# Patient Record
Sex: MALE | Employment: UNEMPLOYED | URBAN - NONMETROPOLITAN AREA
[De-identification: names, ages, dates, MRNs, and addresses within clinical notes are randomized per-mention and may not be internally consistent; named-entity substitution may affect disease eponyms.]

---

## 2024-03-26 ENCOUNTER — OFFICE VISIT (OUTPATIENT)
Dept: SURGERY | Age: 34
End: 2024-03-26
Payer: COMMERCIAL

## 2024-03-26 VITALS
TEMPERATURE: 97.9 F | HEART RATE: 84 BPM | HEIGHT: 70 IN | OXYGEN SATURATION: 99 % | BODY MASS INDEX: 26.48 KG/M2 | WEIGHT: 185 LBS

## 2024-03-26 DIAGNOSIS — K43.6 INCARCERATED VENTRAL HERNIA: Primary | ICD-10-CM

## 2024-03-26 PROCEDURE — 99203 OFFICE O/P NEW LOW 30 MIN: CPT | Performed by: SURGERY

## 2024-03-26 ASSESSMENT — ENCOUNTER SYMPTOMS
DIARRHEA: 0
ABDOMINAL PAIN: 1
BACK PAIN: 0
EYE PAIN: 0
EYE REDNESS: 0
NAUSEA: 0
COLOR CHANGE: 0
SORE THROAT: 0
CONSTIPATION: 0
VOMITING: 0
ABDOMINAL DISTENTION: 0
COUGH: 0
SHORTNESS OF BREATH: 0
CHEST TIGHTNESS: 0

## 2024-03-26 NOTE — PROGRESS NOTES
than 15 pounds pulling for 6 weeks.     Given he is incarcerated, will need to work with the [facility] regarding scheduling, and open or robotic. Preference is robotic if it can be approved.     Return for Post-operative Visit.    Linn Jenkins DO 3/26/2024 10:04 AM

## 2024-03-27 ENCOUNTER — TELEPHONE (OUTPATIENT)
Dept: SURGERY | Age: 34
End: 2024-03-27

## 2024-03-27 NOTE — TELEPHONE ENCOUNTER
Spoke with Loulou Orellana/[facility] regarding preop testing - Forwarded her to preadmission testing - She will call our office when she gets the surgery approved

## 2024-03-27 NOTE — TELEPHONE ENCOUNTER
3/27/2024 Loulou Orellana with [facility] called and stated she saw where patient was recommended for surgical repair.  She is needing information regarding Pre-op testing and surgery.  Please return call.    Callback # [phone]  or ask for Loulou clifton

## 2024-04-29 ENCOUNTER — HOSPITAL ENCOUNTER (OUTPATIENT)
Dept: PREADMISSION TESTING | Age: 34
Discharge: HOME | End: 2024-05-03
Payer: COMMERCIAL

## 2024-04-29 VITALS — WEIGHT: 170 LBS | BODY MASS INDEX: 24.39 KG/M2

## 2024-04-29 RX ORDER — IBUPROFEN 600 MG/1
600 TABLET ORAL 2 TIMES DAILY PRN
COMMUNITY

## 2024-04-29 RX ORDER — DIPHENHYDRAMINE HCL 50 MG
50 CAPSULE ORAL EVERY 8 HOURS PRN
COMMUNITY

## 2024-04-29 RX ORDER — HYDROCORTISONE 25 MG/ML
LOTION TOPICAL DAILY
COMMUNITY

## 2024-05-02 ENCOUNTER — ANESTHESIA (OUTPATIENT)
Dept: OPERATING ROOM | Age: 34
End: 2024-05-02
Payer: COMMERCIAL

## 2024-05-02 ENCOUNTER — HOSPITAL ENCOUNTER (OUTPATIENT)
Age: 34
Setting detail: OUTPATIENT SURGERY
Discharge: HOME | End: 2024-05-02
Attending: SURGERY | Admitting: SURGERY
Payer: COMMERCIAL

## 2024-05-02 ENCOUNTER — ANESTHESIA EVENT (OUTPATIENT)
Dept: OPERATING ROOM | Age: 34
End: 2024-05-02
Payer: COMMERCIAL

## 2024-05-02 VITALS
OXYGEN SATURATION: 95 % | DIASTOLIC BLOOD PRESSURE: 91 MMHG | SYSTOLIC BLOOD PRESSURE: 141 MMHG | HEIGHT: 70 IN | TEMPERATURE: 97.2 F | WEIGHT: 170 LBS | BODY MASS INDEX: 24.34 KG/M2 | RESPIRATION RATE: 16 BRPM | HEART RATE: 87 BPM

## 2024-05-02 DIAGNOSIS — K43.6 INCARCERATED VENTRAL HERNIA: Primary | ICD-10-CM

## 2024-05-02 PROCEDURE — 6360000002 HC RX W HCPCS: Performed by: SURGERY

## 2024-05-02 PROCEDURE — 2500000003 HC RX 250 WO HCPCS: Performed by: NURSE ANESTHETIST, CERTIFIED REGISTERED

## 2024-05-02 PROCEDURE — 3700000000 HC ANESTHESIA ATTENDED CARE: Performed by: SURGERY

## 2024-05-02 PROCEDURE — 2580000003 HC RX 258: Performed by: SURGERY

## 2024-05-02 PROCEDURE — 3600000019 HC SURGERY ROBOT ADDTL 15MIN: Performed by: SURGERY

## 2024-05-02 PROCEDURE — 7100000010 HC PHASE II RECOVERY - FIRST 15 MIN: Performed by: SURGERY

## 2024-05-02 PROCEDURE — 49593 RPR AA HRN 1ST 3-10 RDC: CPT | Performed by: SURGERY

## 2024-05-02 PROCEDURE — 3600000009 HC SURGERY ROBOT BASE: Performed by: SURGERY

## 2024-05-02 PROCEDURE — 6360000002 HC RX W HCPCS: Performed by: NURSE ANESTHETIST, CERTIFIED REGISTERED

## 2024-05-02 PROCEDURE — C9290 INJ, BUPIVACAINE LIPOSOME: HCPCS | Performed by: SURGERY

## 2024-05-02 PROCEDURE — 7100000001 HC PACU RECOVERY - ADDTL 15 MIN: Performed by: SURGERY

## 2024-05-02 PROCEDURE — 7100000011 HC PHASE II RECOVERY - ADDTL 15 MIN: Performed by: SURGERY

## 2024-05-02 PROCEDURE — 2580000003 HC RX 258: Performed by: ANESTHESIOLOGY

## 2024-05-02 PROCEDURE — C1781 MESH (IMPLANTABLE): HCPCS | Performed by: SURGERY

## 2024-05-02 PROCEDURE — 2709999900 HC NON-CHARGEABLE SUPPLY: Performed by: SURGERY

## 2024-05-02 PROCEDURE — S2900 ROBOTIC SURGICAL SYSTEM: HCPCS | Performed by: SURGERY

## 2024-05-02 PROCEDURE — 6360000002 HC RX W HCPCS: Performed by: ANESTHESIOLOGY

## 2024-05-02 PROCEDURE — 3700000001 HC ADD 15 MINUTES (ANESTHESIA): Performed by: SURGERY

## 2024-05-02 PROCEDURE — 7100000000 HC PACU RECOVERY - FIRST 15 MIN: Performed by: SURGERY

## 2024-05-02 DEVICE — MESH SURG DIA4.5IN CIR W/ ECHO 2 POS SYS VENTRALIGHT: Type: IMPLANTABLE DEVICE | Status: FUNCTIONAL

## 2024-05-02 RX ORDER — PROPOFOL 10 MG/ML
INJECTION, EMULSION INTRAVENOUS PRN
Status: DISCONTINUED | OUTPATIENT
Start: 2024-05-02 | End: 2024-05-02 | Stop reason: SDUPTHER

## 2024-05-02 RX ORDER — SODIUM CHLORIDE, SODIUM LACTATE, POTASSIUM CHLORIDE, CALCIUM CHLORIDE 600; 310; 30; 20 MG/100ML; MG/100ML; MG/100ML; MG/100ML
INJECTION, SOLUTION INTRAVENOUS CONTINUOUS
Status: DISCONTINUED | OUTPATIENT
Start: 2024-05-02 | End: 2024-05-02 | Stop reason: HOSPADM

## 2024-05-02 RX ORDER — SODIUM CHLORIDE 0.9 % (FLUSH) 0.9 %
5-40 SYRINGE (ML) INJECTION PRN
Status: DISCONTINUED | OUTPATIENT
Start: 2024-05-02 | End: 2024-05-02 | Stop reason: HOSPADM

## 2024-05-02 RX ORDER — HYDRALAZINE HYDROCHLORIDE 20 MG/ML
10 INJECTION INTRAMUSCULAR; INTRAVENOUS
Status: DISCONTINUED | OUTPATIENT
Start: 2024-05-02 | End: 2024-05-02 | Stop reason: HOSPADM

## 2024-05-02 RX ORDER — HYDROMORPHONE HYDROCHLORIDE 1 MG/ML
0.25 INJECTION, SOLUTION INTRAMUSCULAR; INTRAVENOUS; SUBCUTANEOUS EVERY 5 MIN PRN
Status: DISCONTINUED | OUTPATIENT
Start: 2024-05-02 | End: 2024-05-02 | Stop reason: HOSPADM

## 2024-05-02 RX ORDER — LIDOCAINE HYDROCHLORIDE 10 MG/ML
INJECTION, SOLUTION INFILTRATION; PERINEURAL PRN
Status: DISCONTINUED | OUTPATIENT
Start: 2024-05-02 | End: 2024-05-02 | Stop reason: SDUPTHER

## 2024-05-02 RX ORDER — LABETALOL 20 MG/4 ML (5 MG/ML) INTRAVENOUS SYRINGE
PRN
Status: DISCONTINUED | OUTPATIENT
Start: 2024-05-02 | End: 2024-05-02 | Stop reason: SDUPTHER

## 2024-05-02 RX ORDER — ONDANSETRON 2 MG/ML
INJECTION INTRAMUSCULAR; INTRAVENOUS PRN
Status: DISCONTINUED | OUTPATIENT
Start: 2024-05-02 | End: 2024-05-02 | Stop reason: SDUPTHER

## 2024-05-02 RX ORDER — NALOXONE HYDROCHLORIDE 0.4 MG/ML
INJECTION, SOLUTION INTRAMUSCULAR; INTRAVENOUS; SUBCUTANEOUS PRN
Status: DISCONTINUED | OUTPATIENT
Start: 2024-05-02 | End: 2024-05-02 | Stop reason: HOSPADM

## 2024-05-02 RX ORDER — HYDROMORPHONE HYDROCHLORIDE 1 MG/ML
0.5 INJECTION, SOLUTION INTRAMUSCULAR; INTRAVENOUS; SUBCUTANEOUS EVERY 5 MIN PRN
Status: DISCONTINUED | OUTPATIENT
Start: 2024-05-02 | End: 2024-05-02 | Stop reason: HOSPADM

## 2024-05-02 RX ORDER — ROCURONIUM BROMIDE 10 MG/ML
INJECTION, SOLUTION INTRAVENOUS PRN
Status: DISCONTINUED | OUTPATIENT
Start: 2024-05-02 | End: 2024-05-02 | Stop reason: SDUPTHER

## 2024-05-02 RX ORDER — ESMOLOL HYDROCHLORIDE 10 MG/ML
INJECTION INTRAVENOUS PRN
Status: DISCONTINUED | OUTPATIENT
Start: 2024-05-02 | End: 2024-05-02 | Stop reason: SDUPTHER

## 2024-05-02 RX ORDER — FENTANYL CITRATE 50 UG/ML
INJECTION, SOLUTION INTRAMUSCULAR; INTRAVENOUS PRN
Status: DISCONTINUED | OUTPATIENT
Start: 2024-05-02 | End: 2024-05-02 | Stop reason: SDUPTHER

## 2024-05-02 RX ORDER — CEFAZOLIN SODIUM 1 G/3ML
INJECTION, POWDER, FOR SOLUTION INTRAMUSCULAR; INTRAVENOUS PRN
Status: DISCONTINUED | OUTPATIENT
Start: 2024-05-02 | End: 2024-05-02 | Stop reason: SDUPTHER

## 2024-05-02 RX ORDER — IPRATROPIUM BROMIDE AND ALBUTEROL SULFATE 2.5; .5 MG/3ML; MG/3ML
1 SOLUTION RESPIRATORY (INHALATION)
Status: DISCONTINUED | OUTPATIENT
Start: 2024-05-02 | End: 2024-05-02 | Stop reason: HOSPADM

## 2024-05-02 RX ORDER — PROCHLORPERAZINE EDISYLATE 5 MG/ML
5 INJECTION INTRAMUSCULAR; INTRAVENOUS
Status: DISCONTINUED | OUTPATIENT
Start: 2024-05-02 | End: 2024-05-02 | Stop reason: HOSPADM

## 2024-05-02 RX ORDER — DIPHENHYDRAMINE HYDROCHLORIDE 50 MG/ML
12.5 INJECTION INTRAMUSCULAR; INTRAVENOUS
Status: DISCONTINUED | OUTPATIENT
Start: 2024-05-02 | End: 2024-05-02 | Stop reason: HOSPADM

## 2024-05-02 RX ORDER — TRAMADOL HYDROCHLORIDE 50 MG/1
50 TABLET ORAL EVERY 6 HOURS PRN
Qty: 12 TABLET | Refills: 0 | Status: SHIPPED | OUTPATIENT
Start: 2024-05-02 | End: 2024-05-05

## 2024-05-02 RX ORDER — LABETALOL HYDROCHLORIDE 5 MG/ML
10 INJECTION, SOLUTION INTRAVENOUS
Status: DISCONTINUED | OUTPATIENT
Start: 2024-05-02 | End: 2024-05-02 | Stop reason: HOSPADM

## 2024-05-02 RX ORDER — SODIUM CHLORIDE 0.9 % (FLUSH) 0.9 %
5-40 SYRINGE (ML) INJECTION EVERY 12 HOURS SCHEDULED
Status: DISCONTINUED | OUTPATIENT
Start: 2024-05-02 | End: 2024-05-02 | Stop reason: HOSPADM

## 2024-05-02 RX ORDER — DEXAMETHASONE SODIUM PHOSPHATE 10 MG/ML
INJECTION, SOLUTION INTRAMUSCULAR; INTRAVENOUS PRN
Status: DISCONTINUED | OUTPATIENT
Start: 2024-05-02 | End: 2024-05-02 | Stop reason: SDUPTHER

## 2024-05-02 RX ORDER — MEPERIDINE HYDROCHLORIDE 25 MG/ML
12.5 INJECTION INTRAMUSCULAR; INTRAVENOUS; SUBCUTANEOUS
Status: DISCONTINUED | OUTPATIENT
Start: 2024-05-02 | End: 2024-05-02 | Stop reason: HOSPADM

## 2024-05-02 RX ORDER — SODIUM CHLORIDE 9 MG/ML
INJECTION, SOLUTION INTRAVENOUS PRN
Status: DISCONTINUED | OUTPATIENT
Start: 2024-05-02 | End: 2024-05-02 | Stop reason: HOSPADM

## 2024-05-02 RX ADMIN — CEFAZOLIN 2 G: 1 INJECTION, POWDER, FOR SOLUTION INTRAMUSCULAR; INTRAVENOUS at 10:51

## 2024-05-02 RX ADMIN — ONDANSETRON 4 MG: 2 INJECTION INTRAMUSCULAR; INTRAVENOUS at 11:40

## 2024-05-02 RX ADMIN — DEXAMETHASONE SODIUM PHOSPHATE 5 MG: 10 INJECTION, SOLUTION INTRAMUSCULAR; INTRAVENOUS at 10:55

## 2024-05-02 RX ADMIN — FENTANYL CITRATE 50 MCG: 0.05 INJECTION, SOLUTION INTRAMUSCULAR; INTRAVENOUS at 10:46

## 2024-05-02 RX ADMIN — LIDOCAINE HYDROCHLORIDE 50 MG: 10 INJECTION, SOLUTION INFILTRATION; PERINEURAL at 10:40

## 2024-05-02 RX ADMIN — HYDROMORPHONE HYDROCHLORIDE 0.5 MG: 1 INJECTION, SOLUTION INTRAMUSCULAR; INTRAVENOUS; SUBCUTANEOUS at 12:59

## 2024-05-02 RX ADMIN — SUGAMMADEX 300 MG: 100 INJECTION, SOLUTION INTRAVENOUS at 11:40

## 2024-05-02 RX ADMIN — FENTANYL CITRATE 50 MCG: 0.05 INJECTION, SOLUTION INTRAMUSCULAR; INTRAVENOUS at 11:01

## 2024-05-02 RX ADMIN — FENTANYL CITRATE 50 MCG: 0.05 INJECTION, SOLUTION INTRAMUSCULAR; INTRAVENOUS at 11:06

## 2024-05-02 RX ADMIN — SODIUM CHLORIDE, SODIUM LACTATE, POTASSIUM CHLORIDE, AND CALCIUM CHLORIDE: 600; 310; 30; 20 INJECTION, SOLUTION INTRAVENOUS at 11:46

## 2024-05-02 RX ADMIN — ESMOLOL HYDROCHLORIDE 40 MG: 10 INJECTION, SOLUTION INTRAVENOUS at 10:49

## 2024-05-02 RX ADMIN — ROCURONIUM BROMIDE 80 MG: 10 INJECTION, SOLUTION INTRAVENOUS at 10:40

## 2024-05-02 RX ADMIN — LABETALOL 20 MG/4 ML (5 MG/ML) INTRAVENOUS SYRINGE 5 MG: at 11:08

## 2024-05-02 RX ADMIN — SODIUM CHLORIDE, SODIUM LACTATE, POTASSIUM CHLORIDE, AND CALCIUM CHLORIDE: 600; 310; 30; 20 INJECTION, SOLUTION INTRAVENOUS at 08:50

## 2024-05-02 RX ADMIN — FENTANYL CITRATE 100 MCG: 0.05 INJECTION, SOLUTION INTRAMUSCULAR; INTRAVENOUS at 10:40

## 2024-05-02 RX ADMIN — PROPOFOL 200 MG: 10 INJECTION, EMULSION INTRAVENOUS at 10:40

## 2024-05-02 ASSESSMENT — PAIN DESCRIPTION - DESCRIPTORS: DESCRIPTORS: ACHING

## 2024-05-02 ASSESSMENT — PAIN - FUNCTIONAL ASSESSMENT
PAIN_FUNCTIONAL_ASSESSMENT: 0-10
PAIN_FUNCTIONAL_ASSESSMENT: 0-10
PAIN_FUNCTIONAL_ASSESSMENT: NONE - DENIES PAIN

## 2024-05-02 ASSESSMENT — LIFESTYLE VARIABLES: SMOKING_STATUS: 1

## 2024-05-02 NOTE — OP NOTE
Operative Note      Patient: Sofia Berg  YOB: 1990  MRN: [id number]    Date of Procedure: 5/2/2024    Pre-Op Diagnosis Codes:     * Incarcerated ventral hernia [K43.6]    Post-Op Diagnosis: Same       Procedure(s):  ROBOTIC INCARCERATED VENTRAL HERNIA REPAIR WITH MESH    Surgeon(s):  Linn Jenkins DO    Assistant:   First Assistant: Cortney Pressley RN    Anesthesia: General    Estimated Blood Loss (mL): Minimal    Complications: None    Specimens:   * No specimens in log *    Implants:  Implant Name Type Inv. Item Serial No.  Lot No. LRB No. Used Action   MESH SURG DIA4.5IN CIR W/ ECHO 2 POS SYS VENTRALIGHT - [id number]  MESH SURG DIA4.5IN CIR W/ ECHO 2 POS SYS VENTRALIGHT  BARD DAVOL-WD [id number] N/A 1 Implanted         Drains:   NG/OG/NJ/NE Tube Orogastric 18 fr Center mouth (Active)       Findings:  Infection Present At Time Of Surgery (PATOS) (choose all levels that have infection present):  No infection present  Other Findings: 3 cm defect. Closed robotically    Detailed Description of Procedure:   Mr. Berg was taken to the main operating room and placed on the operating  table supine with the left side raised slightly on a bump and slightly jackknifed. After the induction of adequate general endotracheal anesthesia, the abdomen was prepped and draped to a sterile field with I-O band. A time-out was undertaken.     A small incision was made two fingerbreadths below the costal margin on the left side after instilling local anesthesic, and through this an optiview trocar was inserted, the abdomen was entered, and a pneumoperitoneum created.  A laparoscope was advanced and inspection undertaken. No evidence of trauma from the trocar site insertion was appreciated. The hernia was seen along the midline with overlying fat pads adherent in the area.      Two additional 8 mm ports were placed along the left anterior axillary line and just superior to the ASIS.  The camera was moved to

## 2024-05-02 NOTE — ANESTHESIA PRE PROCEDURE
Department of Anesthesiology  Preprocedure Note       Name:  Sofia Berg   Age:  33 y.o.  :  1990                                          MRN:  [id number]         Date:  2024      Surgeon: Surgeon(s):  Linn Jenkins DO    Procedure: Procedure(s):  ROBOTIC INCARCERATED VENTRAL HERNIA REPAIR WITH MESH    Medications prior to admission:   Prior to Admission medications    Medication Sig Start Date End Date Taking? Authorizing Provider   diphenhydrAMINE (BENADRYL) 50 MG capsule Take 1 capsule by mouth every 8 hours as needed for Itching    ProviderTiti MD   hydrocortisone (HYTONE) 2.5 % lotion Apply topically daily    ProviderTiti MD   ibuprofen (ADVIL;MOTRIN) 600 MG tablet Take 1 tablet by mouth 2 times daily as needed    ProviderTiti MD       Current medications:    Current Facility-Administered Medications   Medication Dose Route Frequency Provider Last Rate Last Admin   • lactated ringers IV soln infusion   IntraVENous Continuous Bibiana Gonzalez  mL/hr at 24 0850 New Bag at 24 0850       Allergies:  No Known Allergies    Problem List:    Patient Active Problem List   Diagnosis Code   • Incarcerated ventral hernia K43.6       Past Medical History:        Diagnosis Date   • Ventral hernia        Past Surgical History:  History reviewed. No pertinent surgical history.    Social History:    Social History     Tobacco Use   • Smoking status: Former     Current packs/day: 0.25     Types: Cigarettes   • Smokeless tobacco: Never   • Tobacco comments:     Current inmate at [facility] as of 2023   Substance Use Topics   • Alcohol use: Not Currently     Comment: Inmate                                Counseling given: Not Answered  Tobacco comments: Current inmate at [facility] as of 2023      Vital Signs (Current):   Vitals:    24 0839   BP: (!) 143/90   Pulse: 85   Resp: 18   Temp: 97.4 °F (36.3 °C)

## 2024-05-02 NOTE — ANESTHESIA POSTPROCEDURE EVALUATION
Department of Anesthesiology  Postprocedure Note    Patient: Sofia Berg  MRN: [id number]  YOB: 1990  Date of evaluation: 5/2/2024    Procedure Summary       Date: 05/02/24 Room / Location: [address]    Anesthesia Start: 1037 Anesthesia Stop: 1202    Procedure: ROBOTIC INCARCERATED VENTRAL HERNIA REPAIR WITH MESH Diagnosis:       Incarcerated ventral hernia      (Incarcerated ventral hernia [K43.6])    Surgeons: Linn Jenkins DO Responsible Provider: John White, APRN - CRNA    Anesthesia Type: general ASA Status: 2            Anesthesia Type: No value filed.    Erica Phase I:      Erica Phase II:      Anesthesia Post Evaluation    Patient location during evaluation: PACU  Patient participation: waiting for patient participation  Level of consciousness: obtunded/minimal responses  Pain score: 0  Airway patency: patent  Nausea & Vomiting: no vomiting and no nausea  Cardiovascular status: hemodynamically stable  Respiratory status: acceptable  Hydration status: stable  Comments: T 97.2  Multimodal analgesia pain management approach  Pain management: adequate    No notable events documented.

## 2024-05-02 NOTE — DISCHARGE INSTR - ACTIVITY
No heavy lifting. No lifting more than 15 pounds for 6 weeks.   No work for 2 weeks.  Wear Abdominal Binder at all times when up and about.   May shower tomorrow.   Do not soak in tub.  No swimming pools, oceans, lakes, etc for 2 weeks.   Do not drive while on pain medications.      Avoid constipation.   Take colace nightly (up to 300 mg) and miralax daily (up to three doses).   Drink 64 ounces of water and take a powdered fiber supplement daily (ie-Metamucil).    If you have signs of infection, call you doctor. These include:   -Increased pain, swelling, warmth, or redness  -Red streaks leading from the incision  -Pus draining from the incision  -A fever > 100.4         Smoking increases your risk for wound infection and additional complications.

## 2024-05-02 NOTE — ANESTHESIA POSTPROCEDURE EVALUATION
Department of Anesthesiology  Postprocedure Note    Patient: Sofia Berg  MRN: [id number]  YOB: 1990  Date of evaluation: 5/2/2024    Procedure Summary       Date: 05/02/24 Room / Location: [address]    Anesthesia Start: 1037 Anesthesia Stop: 1202    Procedure: ROBOTIC INCARCERATED VENTRAL HERNIA REPAIR WITH MESH Diagnosis:       Incarcerated ventral hernia      (Incarcerated ventral hernia [K43.6])    Surgeons: Linn Jenkins DO Responsible Provider: John White, APRN - CRNA    Anesthesia Type: general ASA Status: 2            Anesthesia Type: No value filed.    Erica Phase I:      Erica Phase II:      Anesthesia Post Evaluation    Patient location during evaluation: PACU  Patient participation: waiting for patient participation  Level of consciousness: obtunded/minimal responses  Pain score: 0  Airway patency: patent  Nausea & Vomiting: no vomiting and no nausea  Cardiovascular status: hemodynamically stable  Respiratory status: acceptable  Hydration status: stable  Comments: T 97.2  Multimodal analgesia pain management approach    No notable events documented.

## 2024-05-02 NOTE — PROGRESS NOTES
Patient presents to pacu with one guard at bedside.  Electronically signed by Juan Pablo Monroe RN on 5/2/2024 at 12:11 PM

## 2024-05-02 NOTE — H&P
bruise/bleed easily.   Psychiatric/Behavioral:  Negative for confusion, sleep disturbance and suicidal ideas.          OBJECTIVE:  Pulse 84   Temp 97.9 °F (36.6 °C) (Temporal)   Ht 1.778 m (5' 10\")   Wt 83.9 kg (185 lb)   SpO2 99%   BMI 26.54 kg/m²   Physical Exam  Vitals reviewed.   Constitutional:       Appearance: He is well-developed.   HENT:      Head: Normocephalic and atraumatic.   Eyes:      Pupils: Pupils are equal, round, and reactive to light.   Cardiovascular:      Rate and Rhythm: Normal rate.   Pulmonary:      Effort: Pulmonary effort is normal.   Abdominal:      General: There is no distension.      Palpations: Abdomen is soft. There is no mass.      Tenderness: There is no abdominal tenderness. There is no guarding or rebound.      Hernia: A hernia (ventral, incarcerated with preperitoneal fat.) is present.   Musculoskeletal:         General: Normal range of motion.      Cervical back: Normal range of motion and neck supple.   Lymphadenopathy:      Cervical: No cervical adenopathy.   Skin:     General: Skin is warm and dry.   Neurological:      Mental Status: He is alert and oriented to person, place, and time.   Psychiatric:         Behavior: Behavior normal.         Thought Content: Thought content normal.         Judgment: Judgment normal.            ASSESSMENT:    Diagnosis Orders   1. Incarcerated ventral hernia                PLAN:  Reviewed hernia pathophysiology with the patient who notes understanding.      The risks, benefits, and alternatives were discussed with the pt. He is willing to accept the risks and proceed with a robotic incarcerated ventral hernia repair with mesh. The surgical risks included but not limited to bleeding, infection, perforation, recurrence, risk of needing further surgery, etc. The anesthetic risks included heart attacks, strokes, pneumonia, phlebitis, etc.  He is willing to accept all risks and proceed with surgery. No guarantees have been given.

## 2024-05-24 ENCOUNTER — OFFICE VISIT (OUTPATIENT)
Dept: SURGERY | Age: 34
End: 2024-05-24
Payer: COMMERCIAL

## 2024-05-24 VITALS
BODY MASS INDEX: 25.05 KG/M2 | WEIGHT: 175 LBS | HEIGHT: 70 IN | DIASTOLIC BLOOD PRESSURE: 80 MMHG | SYSTOLIC BLOOD PRESSURE: 116 MMHG | TEMPERATURE: 97.9 F | OXYGEN SATURATION: 96 % | HEART RATE: 57 BPM

## 2024-05-24 DIAGNOSIS — K43.6 INCARCERATED VENTRAL HERNIA: Primary | ICD-10-CM

## 2024-05-24 PROCEDURE — 99213 OFFICE O/P EST LOW 20 MIN: CPT | Performed by: SURGERY

## 2024-05-24 ASSESSMENT — ENCOUNTER SYMPTOMS
COLOR CHANGE: 0
EYE REDNESS: 0
CONSTIPATION: 0
ABDOMINAL DISTENTION: 0
DIARRHEA: 0
VOMITING: 0
EYE PAIN: 0
BACK PAIN: 0
NAUSEA: 0
SHORTNESS OF BREATH: 0
CHEST TIGHTNESS: 0
ABDOMINAL PAIN: 1
COUGH: 0
SORE THROAT: 0

## 2024-05-24 NOTE — PROGRESS NOTES
SUBJECTIVE:  Mr. Berg is a 33 y.o. male who presents today two weeks s/p robotic ventral hernia repair with mesh. He has some incisional tenderness. At times he has pain at the hernia site.     Patient's medications, allergies, past medical, surgical, social and family histories werereviewed and updated as appropriate.    Review of Systems   Constitutional:  Negative for fatigue, fever and unexpected weight change.   HENT:  Negative for hearing loss, nosebleeds and sore throat.    Eyes:  Negative for pain, redness and visual disturbance.   Respiratory:  Negative for cough, chest tightness and shortness of breath.    Cardiovascular:  Negative for chest pain, palpitations and leg swelling.   Gastrointestinal:  Positive for abdominal pain. Negative for abdominal distention, constipation, diarrhea, nausea and vomiting.   Endocrine: Negative for cold intolerance, heat intolerance and polydipsia.   Genitourinary:  Negative for difficulty urinating, frequency and urgency.   Musculoskeletal:  Negative for back pain, joint swelling and neck pain.   Skin:  Positive for rash. Negative for color change and wound.   Allergic/Immunologic: Negative for environmental allergies and food allergies.   Neurological:  Negative for seizures, light-headedness and headaches.   Hematological:  Negative for adenopathy. Does not bruise/bleed easily.   Psychiatric/Behavioral:  Negative for confusion, sleep disturbance and suicidal ideas.        OBJECTIVE:  /80 (Site: Left Upper Arm, Position: Sitting)   Pulse 57   Temp 97.9 °F (36.6 °C) (Temporal)   Ht 1.778 m (5' 10\")   Wt 79.4 kg (175 lb)   SpO2 96%   BMI 25.11 kg/m²   Physical Exam  Constitutional:       General: He is not in acute distress.     Appearance: He is normal weight. He is not ill-appearing.   Pulmonary:      Effort: Pulmonary effort is normal.   Abdominal:      General: There is no distension.      Palpations: Abdomen is soft. There is no mass.      Tenderness:

## (undated) DEVICE — GENERAL LAP CDS

## (undated) DEVICE — SOLUTION ANTIFOG VIS SYS CLEARIFY LAPSCP

## (undated) DEVICE — SUTURE MONOCRYL SZ 4-0 L18IN ABSRB UD L19MM PS-2 3/8 CIR PRIM Y496G

## (undated) DEVICE — TUBE ET 7.5MM NSL ORAL BASIC CUF INTMED MURPHY EYE RADPQ

## (undated) DEVICE — 3M™ IOBAN™ 2 ANTIMICROBIAL INCISE DRAPE 6650EZ: Brand: IOBAN™ 2

## (undated) DEVICE — BLADELESS OBTURATOR: Brand: WECK VISTA

## (undated) DEVICE — SUTURE STRATAFIX SYMMETRIC PDS + SZ 0 L9IN ABSRB VIO L36MM SXPP1A425

## (undated) DEVICE — TRI-LUMEN FILTERED TUBE SET WITH ACTIVATED CHARCOAL FILTER: Brand: AIRSEAL

## (undated) DEVICE — BINDER ABD UNISX 4 PNL PREM 6INX6INX12IN L XL 4

## (undated) DEVICE — ARM DRAPE

## (undated) DEVICE — LARYNGOSCOPE VID MILLER 2 MTL BLADE M HNDL CURAPLEX

## (undated) DEVICE — TROCAR: Brand: KII FIOS FIRST ENTRY

## (undated) DEVICE — COVER LT HNDL BLU PLAS

## (undated) DEVICE — SEAL

## (undated) DEVICE — TIP COVER ACCESSORY

## (undated) DEVICE — SUTURE VICRYL CTD ANTBCTRL 54 IN TI PLU VLT

## (undated) DEVICE — DRESSING TRNSPAR W5XL4.5IN FLM SHT SEMIPERMEABLE WIND

## (undated) DEVICE — STRATAFIX SPRL PDS + 2-0 23CM CT-2

## (undated) DEVICE — LIQUIBAND RAPID ADHESIVE 36/CS 0.8ML: Brand: MEDLINE

## (undated) DEVICE — SUTURE MONOCRYL + SZ 4-0 L18IN ABSRB UD L19MM PS-2 3/8 CIR MCP496G

## (undated) DEVICE — COLUMN DRAPE

## (undated) DEVICE — COVER,MAYO STAND,STERILE: Brand: MEDLINE

## (undated) DEVICE — GAUZE,SPONGE,4"X4",8PLY,STRL,LF,10/TRAY: Brand: MEDLINE

## (undated) DEVICE — AIRSEAL 12 MM ACCESS PORT AND PALM GRIP OBTURATOR WITH BLADELESS OPTICAL TIP, 120 MM LENGTH: Brand: AIRSEAL

## (undated) DEVICE — GOWN, ORBIS, XLONG/XLARGE, STERILE: Brand: MEDLINE

## (undated) DEVICE — GLOVE SURG SZ 7 CRM LTX FREE POLYISOPRENE POLYMER BEAD ANTI